# Patient Record
Sex: FEMALE | HISPANIC OR LATINO | URBAN - METROPOLITAN AREA
[De-identification: names, ages, dates, MRNs, and addresses within clinical notes are randomized per-mention and may not be internally consistent; named-entity substitution may affect disease eponyms.]

---

## 2023-04-04 ENCOUNTER — APPOINTMENT (RX ONLY)
Dept: URBAN - METROPOLITAN AREA CLINIC 15 | Facility: CLINIC | Age: 53
Setting detail: DERMATOLOGY
End: 2023-04-04

## 2023-04-04 DIAGNOSIS — Z41.9 ENCOUNTER FOR PROCEDURE FOR PURPOSES OTHER THAN REMEDYING HEALTH STATE, UNSPECIFIED: ICD-10-CM

## 2023-04-04 PROCEDURE — ? BOTOX

## 2023-04-04 PROCEDURE — ? COSMETIC CONSULTATION: BOTOX

## 2023-04-04 NOTE — PROCEDURE: BOTOX
L Brow Units: 0
Show Right And Left Periorbital Units: No
Show Masseter Units: Yes
Additional Area 5 Location: upper lip
Consent: Written consent obtained. Risks include but not limited to lid/brow ptosis, bruising, swelling, diplopia, temporary effect, incomplete chemical denervation.
Post-Care Instructions: Patient instructed to not lie down for 4 hours and limit physical activity for 24 hours. Patient instructed not to travel by airplane for 48 hours.
Forehead Units: 15
Glabellar Complex Units: 25
Expiration Date (Month Year): 12/24
Detail Level: Zone
Additional Area 3 Location: right eye
Lot #: [id number]
Incrementing Botox Units: By 0.5 Units
Additional Area 6 Location: chin
Show Inventory Tab: Show
Dilution (U/0.1 Cc): 1
Additional Area 2 Location: lip flip
Periorbital Skin Units: 10

## 2023-04-04 NOTE — HPI: COSMETIC (BOTOX)
Have You Had Botox Before?: has had botox
Additional History: Beta sine applied.  Patient paid Botox special 3 areas
When Was Your Last Botox Treatment?: Years ago

## 2023-04-25 ENCOUNTER — APPOINTMENT (RX ONLY)
Dept: URBAN - METROPOLITAN AREA CLINIC 15 | Facility: CLINIC | Age: 53
Setting detail: DERMATOLOGY
End: 2023-04-25

## 2023-04-25 DIAGNOSIS — Z41.9 ENCOUNTER FOR PROCEDURE FOR PURPOSES OTHER THAN REMEDYING HEALTH STATE, UNSPECIFIED: ICD-10-CM

## 2023-04-25 PROCEDURE — ? BOTOX

## 2023-04-25 NOTE — PROCEDURE: BOTOX
Lcl Root Units: 0
Expiration Date (Month Year): 12/24
Show Topical Anesthesia: Yes
Incrementing Botox Units: By 0.5 Units
Show Lcl Units: No
Additional Area 3 Location: right eye
Show Inventory Tab: Show
Dilution (U/0.1 Cc): 1
Additional Area 2 Location: lip flip
Lot #: [id number]
Forehead Units: 12
Consent: Written consent obtained. Risks include but not limited to lid/brow ptosis, bruising, swelling, diplopia, temporary effect, incomplete chemical denervation.
Additional Area 1 Location: [facility]
Additional Area 6 Location: chin
Additional Area 5 Location: upper lip
Post-Care Instructions: Patient instructed to not lie down for 4 hours and limit physical activity for 24 hours. Patient instructed not to travel by airplane for 48 hours.
Detail Level: Zone